# Patient Record
Sex: FEMALE | URBAN - METROPOLITAN AREA
[De-identification: names, ages, dates, MRNs, and addresses within clinical notes are randomized per-mention and may not be internally consistent; named-entity substitution may affect disease eponyms.]

---

## 2020-07-16 ENCOUNTER — APPOINTMENT (RX ONLY)
Dept: URBAN - METROPOLITAN AREA CLINIC 148 | Facility: CLINIC | Age: 75
Setting detail: DERMATOLOGY
End: 2020-07-16

## 2020-07-16 VITALS — TEMPERATURE: 96.8 F

## 2020-07-16 DIAGNOSIS — L82.1 OTHER SEBORRHEIC KERATOSIS: ICD-10-CM

## 2020-07-16 DIAGNOSIS — L21.8 OTHER SEBORRHEIC DERMATITIS: ICD-10-CM

## 2020-07-16 DIAGNOSIS — L20.89 OTHER ATOPIC DERMATITIS: ICD-10-CM

## 2020-07-16 PROBLEM — L20.84 INTRINSIC (ALLERGIC) ECZEMA: Status: ACTIVE | Noted: 2020-07-16

## 2020-07-16 PROCEDURE — 99202 OFFICE O/P NEW SF 15 MIN: CPT

## 2020-07-16 PROCEDURE — ? COUNSELING

## 2020-07-16 PROCEDURE — ? PRESCRIPTION MEDICATION MANAGEMENT

## 2020-07-16 PROCEDURE — ? EDUCATIONAL RESOURCES PROVIDED

## 2020-07-16 PROCEDURE — ? PRESCRIPTION

## 2020-07-16 PROCEDURE — ? ADDITIONAL NOTES

## 2020-07-16 RX ORDER — KETOCONAZOLE 20 MG/G
CREAM TOPICAL QD
Qty: 1 | Refills: 1 | Status: ERX | COMMUNITY
Start: 2020-07-16

## 2020-07-16 RX ORDER — HYDROCORTISONE 25 MG/G
CREAM TOPICAL
Qty: 1 | Refills: 0 | Status: ERX | COMMUNITY
Start: 2020-07-16

## 2020-07-16 RX ORDER — KETOCONAZOLE 20 MG/ML
SHAMPOO, SUSPENSION TOPICAL BIW
Qty: 1 | Refills: 2 | Status: ERX | COMMUNITY
Start: 2020-07-16

## 2020-07-16 RX ORDER — FLUOCINOLONE ACETONIDE 0.11 MG/ML
OIL TOPICAL
Qty: 1 | Refills: 0 | Status: ERX | COMMUNITY
Start: 2020-07-16

## 2020-07-16 RX ADMIN — KETOCONAZOLE: 20 SHAMPOO, SUSPENSION TOPICAL at 00:00

## 2020-07-16 RX ADMIN — KETOCONAZOLE: 20 CREAM TOPICAL at 00:00

## 2020-07-16 RX ADMIN — FLUOCINOLONE ACETONIDE: 0.11 OIL TOPICAL at 00:00

## 2020-07-16 RX ADMIN — HYDROCORTISONE: 25 CREAM TOPICAL at 00:00

## 2020-07-16 ASSESSMENT — LOCATION DETAILED DESCRIPTION DERM
LOCATION DETAILED: LEFT FOREHEAD
LOCATION DETAILED: LEFT ANTECUBITAL SKIN
LOCATION DETAILED: MEDIAL FRONTAL SCALP
LOCATION DETAILED: RIGHT ANTERIOR EARLOBE
LOCATION DETAILED: RIGHT ANTECUBITAL SKIN
LOCATION DETAILED: MID TRAPEZIAL NECK
LOCATION DETAILED: LEFT ANTERIOR EARLOBE

## 2020-07-16 ASSESSMENT — LOCATION ZONE DERM
LOCATION ZONE: SCALP
LOCATION ZONE: ARM
LOCATION ZONE: FACE
LOCATION ZONE: EAR
LOCATION ZONE: NECK

## 2020-07-16 ASSESSMENT — LOCATION SIMPLE DESCRIPTION DERM
LOCATION SIMPLE: FRONTAL SCALP
LOCATION SIMPLE: TRAPEZIAL NECK
LOCATION SIMPLE: LEFT UPPER ARM
LOCATION SIMPLE: LEFT FOREHEAD
LOCATION SIMPLE: RIGHT EAR
LOCATION SIMPLE: RIGHT UPPER ARM
LOCATION SIMPLE: LEFT EAR

## 2020-07-16 NOTE — PROCEDURE: PRESCRIPTION MEDICATION MANAGEMENT
Initiate Treatment: Use HTC 2.5% cream to area bid x 7 days, then prn twice weekly if neede
Render In Strict Bullet Format?: No
Detail Level: Zone

## 2021-07-12 ENCOUNTER — APPOINTMENT (RX ONLY)
Dept: URBAN - METROPOLITAN AREA CLINIC 148 | Facility: CLINIC | Age: 76
Setting detail: DERMATOLOGY
End: 2021-07-12

## 2021-07-12 DIAGNOSIS — L21.8 OTHER SEBORRHEIC DERMATITIS: ICD-10-CM | Status: STABLE

## 2021-07-12 DIAGNOSIS — T07XXXA INSECT BITE, NONVENOMOUS, OF OTHER, MULTIPLE, AND UNSPECIFIED SITES, WITHOUT MENTION OF INFECTION: ICD-10-CM

## 2021-07-12 PROBLEM — S40.861A INSECT BITE (NONVENOMOUS) OF RIGHT UPPER ARM, INITIAL ENCOUNTER: Status: ACTIVE | Noted: 2021-07-12

## 2021-07-12 PROBLEM — S80.862A INSECT BITE (NONVENOMOUS), LEFT LOWER LEG, INITIAL ENCOUNTER: Status: ACTIVE | Noted: 2021-07-12

## 2021-07-12 PROCEDURE — 99214 OFFICE O/P EST MOD 30 MIN: CPT

## 2021-07-12 PROCEDURE — ? COUNSELING

## 2021-07-12 PROCEDURE — ? PRESCRIPTION MEDICATION MANAGEMENT

## 2021-07-12 PROCEDURE — ? ADDITIONAL NOTES

## 2021-07-12 ASSESSMENT — LOCATION ZONE DERM
LOCATION ZONE: ARM
LOCATION ZONE: LEG
LOCATION ZONE: EAR
LOCATION ZONE: SCALP

## 2021-07-12 ASSESSMENT — LOCATION DETAILED DESCRIPTION DERM
LOCATION DETAILED: LEFT DISTAL PRETIBIAL REGION
LOCATION DETAILED: LEFT ANTERIOR EARLOBE
LOCATION DETAILED: RIGHT ANTERIOR EARLOBE
LOCATION DETAILED: MEDIAL FRONTAL SCALP
LOCATION DETAILED: RIGHT ANTERIOR DISTAL UPPER ARM

## 2021-07-12 ASSESSMENT — LOCATION SIMPLE DESCRIPTION DERM
LOCATION SIMPLE: FRONTAL SCALP
LOCATION SIMPLE: RIGHT EAR
LOCATION SIMPLE: LEFT EAR
LOCATION SIMPLE: LEFT PRETIBIAL REGION
LOCATION SIMPLE: RIGHT UPPER ARM

## 2021-07-12 NOTE — PROCEDURE: ADDITIONAL NOTES
Additional Notes: Patient to use clobetasol cream that she has at home with the OTC Benadryl cream on affected areas.
Render Risk Assessment In Note?: no
Detail Level: Zone
Additional Notes: Patient consent was obtained to proceed with the visit and recommended plan of care after discussion of all risks and benefits, including the risks of COVID-19 exposure.

## 2021-07-12 NOTE — PROCEDURE: PRESCRIPTION MEDICATION MANAGEMENT
Initiate Treatment: Clobetasol ointment (pt has already) Bid x 7-10 days
Detail Level: Zone
Render In Strict Bullet Format?: No
Initiate Treatment: HTC 2.5% cream BID x 7 days then restart Ketoconazole Shampoo 2x a week

## 2022-07-30 ENCOUNTER — TELEPHONE ENCOUNTER (OUTPATIENT)
Age: 77
End: 2022-07-30

## 2022-07-31 ENCOUNTER — TELEPHONE ENCOUNTER (OUTPATIENT)
Age: 77
End: 2022-07-31

## 2023-06-27 ENCOUNTER — APPOINTMENT (RX ONLY)
Dept: URBAN - METROPOLITAN AREA CLINIC 148 | Facility: CLINIC | Age: 78
Setting detail: DERMATOLOGY
End: 2023-06-27

## 2023-06-27 DIAGNOSIS — L21.8 OTHER SEBORRHEIC DERMATITIS: ICD-10-CM

## 2023-06-27 DIAGNOSIS — T07XXXA INSECT BITE, NONVENOMOUS, OF OTHER, MULTIPLE, AND UNSPECIFIED SITES, WITHOUT MENTION OF INFECTION: ICD-10-CM

## 2023-06-27 PROBLEM — S60.561A INSECT BITE (NONVENOMOUS) OF RIGHT HAND, INITIAL ENCOUNTER: Status: ACTIVE | Noted: 2023-06-27

## 2023-06-27 PROBLEM — S60.562A INSECT BITE (NONVENOMOUS) OF LEFT HAND, INITIAL ENCOUNTER: Status: ACTIVE | Noted: 2023-06-27

## 2023-06-27 PROCEDURE — 99213 OFFICE O/P EST LOW 20 MIN: CPT

## 2023-06-27 PROCEDURE — ? PRESCRIPTION

## 2023-06-27 PROCEDURE — ? COUNSELING

## 2023-06-27 RX ORDER — TRIAMCINOLONE ACETONIDE 1 MG/G
CREAM TOPICAL BID
Qty: 80 | Refills: 0 | Status: ERX | COMMUNITY
Start: 2023-06-27

## 2023-06-27 RX ORDER — KETOCONAZOLE 20 MG/ML
SHAMPOO, SUSPENSION TOPICAL
Qty: 120 | Refills: 6 | Status: ERX | COMMUNITY
Start: 2023-06-27

## 2023-06-27 RX ADMIN — KETOCONAZOLE: 20 SHAMPOO, SUSPENSION TOPICAL at 00:00

## 2023-06-27 RX ADMIN — TRIAMCINOLONE ACETONIDE: 1 CREAM TOPICAL at 00:00

## 2023-06-27 ASSESSMENT — LOCATION DETAILED DESCRIPTION DERM
LOCATION DETAILED: LEFT RADIAL DORSAL HAND
LOCATION DETAILED: RIGHT SUPERIOR PARIETAL SCALP
LOCATION DETAILED: LEFT INFERIOR POSTAURICULAR SKIN
LOCATION DETAILED: RIGHT RADIAL DORSAL HAND
LOCATION DETAILED: RIGHT CENTRAL POSTAURICULAR SKIN

## 2023-06-27 ASSESSMENT — LOCATION ZONE DERM
LOCATION ZONE: SCALP
LOCATION ZONE: HAND

## 2023-06-27 ASSESSMENT — LOCATION SIMPLE DESCRIPTION DERM
LOCATION SIMPLE: LEFT HAND
LOCATION SIMPLE: RIGHT HAND
LOCATION SIMPLE: SCALP

## 2023-11-06 ENCOUNTER — APPOINTMENT (RX ONLY)
Dept: URBAN - METROPOLITAN AREA CLINIC 148 | Facility: CLINIC | Age: 78
Setting detail: DERMATOLOGY
End: 2023-11-06

## 2023-11-06 DIAGNOSIS — L20.89 OTHER ATOPIC DERMATITIS: ICD-10-CM

## 2023-11-06 DIAGNOSIS — L82.1 OTHER SEBORRHEIC KERATOSIS: ICD-10-CM

## 2023-11-06 DIAGNOSIS — L30.4 ERYTHEMA INTERTRIGO: ICD-10-CM

## 2023-11-06 DIAGNOSIS — L21.8 OTHER SEBORRHEIC DERMATITIS: ICD-10-CM | Status: STABLE

## 2023-11-06 PROCEDURE — ? COUNSELING

## 2023-11-06 PROCEDURE — ? PRESCRIPTION

## 2023-11-06 PROCEDURE — 99214 OFFICE O/P EST MOD 30 MIN: CPT

## 2023-11-06 PROCEDURE — ? TREATMENT REGIMEN

## 2023-11-06 RX ORDER — KETOCONAZOLE 20 MG/G
CREAM TOPICAL BID
Qty: 60 | Refills: 6 | Status: ERX | COMMUNITY
Start: 2023-11-06

## 2023-11-06 RX ORDER — NYSTATIN 100000 [USP'U]/G
POWDER TOPICAL
Qty: 30 | Refills: 0 | Status: ERX | COMMUNITY
Start: 2023-11-06

## 2023-11-06 RX ORDER — TRIAMCINOLONE ACETONIDE 1 MG/G
CREAM TOPICAL BID
Qty: 80 | Refills: 0 | Status: ERX | COMMUNITY
Start: 2023-11-06

## 2023-11-06 RX ADMIN — NYSTATIN: 100000 POWDER TOPICAL at 00:00

## 2023-11-06 RX ADMIN — TRIAMCINOLONE ACETONIDE: 1 CREAM TOPICAL at 00:00

## 2023-11-06 RX ADMIN — KETOCONAZOLE: 20 CREAM TOPICAL at 00:00

## 2023-11-06 ASSESSMENT — LOCATION DETAILED DESCRIPTION DERM
LOCATION DETAILED: RIGHT RIB CAGE
LOCATION DETAILED: RIGHT MEDIAL FOREHEAD
LOCATION DETAILED: LEFT RIB CAGE
LOCATION DETAILED: LEFT VENTRAL PROXIMAL FOREARM
LOCATION DETAILED: RIGHT VENTRAL PROXIMAL FOREARM
LOCATION DETAILED: LEFT INFERIOR POSTAURICULAR SKIN
LOCATION DETAILED: RIGHT CENTRAL POSTAURICULAR SKIN
LOCATION DETAILED: RIGHT SUPERIOR PARIETAL SCALP

## 2023-11-06 ASSESSMENT — LOCATION SIMPLE DESCRIPTION DERM
LOCATION SIMPLE: RIGHT FOREARM
LOCATION SIMPLE: RIGHT FOREHEAD
LOCATION SIMPLE: LEFT FOREARM
LOCATION SIMPLE: SCALP
LOCATION SIMPLE: ABDOMEN

## 2023-11-06 ASSESSMENT — LOCATION ZONE DERM
LOCATION ZONE: SCALP
LOCATION ZONE: ARM
LOCATION ZONE: FACE
LOCATION ZONE: TRUNK

## 2024-03-07 ENCOUNTER — APPOINTMENT (RX ONLY)
Dept: URBAN - METROPOLITAN AREA CLINIC 148 | Facility: CLINIC | Age: 79
Setting detail: DERMATOLOGY
End: 2024-03-07

## 2024-03-07 DIAGNOSIS — L21.8 OTHER SEBORRHEIC DERMATITIS: ICD-10-CM | Status: WELL CONTROLLED

## 2024-03-07 DIAGNOSIS — L23.9 ALLERGIC CONTACT DERMATITIS, UNSPECIFIED CAUSE: ICD-10-CM

## 2024-03-07 DIAGNOSIS — L20.89 OTHER ATOPIC DERMATITIS: ICD-10-CM | Status: IMPROVED

## 2024-03-07 DIAGNOSIS — L30.4 ERYTHEMA INTERTRIGO: ICD-10-CM | Status: RESOLVED

## 2024-03-07 PROCEDURE — ? PRESCRIPTION

## 2024-03-07 PROCEDURE — ? TREATMENT REGIMEN

## 2024-03-07 PROCEDURE — 99214 OFFICE O/P EST MOD 30 MIN: CPT

## 2024-03-07 PROCEDURE — ? PRESCRIPTION MEDICATION MANAGEMENT

## 2024-03-07 PROCEDURE — ? COUNSELING

## 2024-03-07 RX ORDER — KETOCONAZOLE 20 MG/ML
SHAMPOO, SUSPENSION TOPICAL
Qty: 120 | Refills: 3 | Status: ERX | COMMUNITY
Start: 2024-03-07

## 2024-03-07 RX ADMIN — KETOCONAZOLE: 20 SHAMPOO, SUSPENSION TOPICAL at 00:00

## 2024-03-07 ASSESSMENT — LOCATION DETAILED DESCRIPTION DERM
LOCATION DETAILED: RIGHT CENTRAL POSTAURICULAR SKIN
LOCATION DETAILED: RIGHT RIB CAGE
LOCATION DETAILED: RIGHT AXILLARY VAULT
LOCATION DETAILED: RIGHT VENTRAL PROXIMAL FOREARM
LOCATION DETAILED: LEFT RIB CAGE
LOCATION DETAILED: LEFT INFERIOR POSTAURICULAR SKIN
LOCATION DETAILED: LEFT VENTRAL PROXIMAL FOREARM
LOCATION DETAILED: LEFT AXILLARY VAULT
LOCATION DETAILED: RIGHT SUPERIOR PARIETAL SCALP

## 2024-03-07 ASSESSMENT — LOCATION SIMPLE DESCRIPTION DERM
LOCATION SIMPLE: LEFT AXILLARY VAULT
LOCATION SIMPLE: SCALP
LOCATION SIMPLE: RIGHT FOREARM
LOCATION SIMPLE: RIGHT AXILLARY VAULT
LOCATION SIMPLE: LEFT FOREARM
LOCATION SIMPLE: ABDOMEN

## 2024-03-07 ASSESSMENT — LOCATION ZONE DERM
LOCATION ZONE: TRUNK
LOCATION ZONE: AXILLAE
LOCATION ZONE: ARM
LOCATION ZONE: SCALP

## 2024-03-07 NOTE — PROCEDURE: PRESCRIPTION MEDICATION MANAGEMENT
Render In Strict Bullet Format?: No
Detail Level: Simple
Continue Regimen: Nystatin powder as needed for flare ups
Continue Regimen: Triamcinolone cream BID x 2 weeks as needed for flare ups
Continue Regimen: All free and clear laundry detergent
Initiate Treatment: Triamcinolone cream BID x 2 weeks (pt has medication already)\\nKetoconazole cream bid for two weeks \\nVanicream moisturizer daily